# Patient Record
Sex: MALE | Race: WHITE
[De-identification: names, ages, dates, MRNs, and addresses within clinical notes are randomized per-mention and may not be internally consistent; named-entity substitution may affect disease eponyms.]

---

## 2021-06-29 ENCOUNTER — HOSPITAL ENCOUNTER (OUTPATIENT)
Dept: HOSPITAL 47 - SLEEP | Age: 41
End: 2021-06-29
Attending: INTERNAL MEDICINE
Payer: COMMERCIAL

## 2021-06-29 DIAGNOSIS — Z79.01: ICD-10-CM

## 2021-06-29 DIAGNOSIS — E66.01: ICD-10-CM

## 2021-06-29 DIAGNOSIS — R06.81: Primary | ICD-10-CM

## 2021-06-29 DIAGNOSIS — I10: ICD-10-CM

## 2021-06-29 DIAGNOSIS — R06.83: ICD-10-CM

## 2021-06-29 DIAGNOSIS — Z86.718: ICD-10-CM

## 2021-06-29 DIAGNOSIS — F17.210: ICD-10-CM

## 2021-06-29 DIAGNOSIS — Z79.899: ICD-10-CM

## 2021-06-29 PROCEDURE — 99202 OFFICE O/P NEW SF 15 MIN: CPT

## 2021-06-29 NOTE — CONS
CONSULTATION



REASON FOR CONSULTATION:

Sleep apnea.



HISTORY OF PRESENT ILLNESS:

40-year-old morbidly obese male patient gained significant amount of weight over the

years.  Works for [email or web address] [place].  Coming in for concerns of sleep

apnea.  He has been noted to snore loud, quit breathing and gasps for air in the middle

of the night and this has been documented by his wife.  He goes to bed around 10 p.m.,

wakes up 6:40 am in the morning.  Feels drowsy and sleepy and somnolent.  [place] score

is 13.  In fact, he has snoring and noisy breathing even when he is awake.  He has

chronic nasal plugging and congestion and he is a mouth breather.  Sleeps on his side.

Never falls asleep while driving.  Never had any motor vehicle accident because of

feeling drowsy or sleepy.  No dreams.  No kicks.  No chest pain.  No shortness of

breath.



PAST MEDICAL HISTORY:

Obesity, history of DVTs more than 20 years ago, factor 5 Leiden deficiency, obesity.



SURGICAL HISTORY:

None.



DRUG ALLERGIES:

None.



MEDICATIONS:

Includes Coumadin 50 mg once a day, losartan 50 mg p.o. daily.



SOCIAL HISTORY:

Smoker, 2/3 of pack of cigarettes a day.  No history of alcoholism.  No history of IV

drugs.



FAMILY HISTORY:

Father  of cardiac disease.  Mother is alive.



REVIEW OF SYSTEMS:

Fourteen-point review of system was done. Positive findings are mentioned in history of

present illness.  He has gained around 80 pounds over the past 10 years.  Occasional

naps during the day.  No sleep paralysis.  No hallucinations.  No cataplexy.



PHYSICAL EXAMINATION:

VITAL SIGNS: BP is 163/90, pulse 94, respirations 18, temperature 97.9. Saturation is

97% on room air.  [place] score is 13.  BMI 41.7.  Neck size is 19-3/4 of an inch.

Height is 6 feet, 3 inches, weight is 340.

GENERAL APPEARANCE:  Obese, calm, comfortable.

HEAD is atraumatic, normocephalic.

NECK:  Supple.  No JVD.  No goiter. No neck mass.  Mallampati class 4.

LUNGS: Diminished, otherwise clear.

HEART:  Heart sounds are regular rate and rhythm. Normal S1/S2.  No S3, no S4. No

murmurs.

ABDOMEN:  Soft, nontender.  No organomegaly.

EXTREMITIES:  No edema. No cyanosis or clubbing.

NEUROLOGIC:  Awake and alert.  There is no focal neurological deficits.

PSYCHIATRIC: Negative for anxiety or depression.



IMPRESSION:

1. Hypersomnia under investigation, [place] score 13.  High clinical suspicion for

    obstructive sleep apnea.

2. Loud snoring.

3. Witnessed apneas.

4. Factor 5 laden with previous history of deep vein thrombosis. Maintained on

    lifelong anticoagulation with warfarin.

5. Hypertension.

6. Obesity with a BMI of 41.7.



PLAN:

1. High suspicion for obstructive sleep apnea.  Proceed with a home sleep study.

2. Encourage weight loss.

3. Implement good sleep hygiene measures.

4. We will continue to follow.





MMODL / IJN: [id number] / Job#: [id number]